# Patient Record
Sex: FEMALE | Race: WHITE
[De-identification: names, ages, dates, MRNs, and addresses within clinical notes are randomized per-mention and may not be internally consistent; named-entity substitution may affect disease eponyms.]

---

## 2017-01-26 NOTE — ED
Influenza-Like Illness





- HPI Summary


HPI Summary: 





The patient is a 21 female presenting to ED for complaint of chest pain with 

coughing and breathing. Admits to sudden onset of nasal congestion, rhinorrhea, 

dry cough, nausea, vomiting x1, and generalized myalgia. Was evaluated at 

[facility] and had positive Influenza A. Was prescribed Tamiflu and 

"Azithromax because they thought I might have PNA." Patient denies significant 

past medical or surgical history. Vaccinations up to date. 





SH: No smoking. Student at [employer].





- History of Current Complaint


Chief Complaint: EDUpperRespComplaint


Time Seen by Provider: 01/26/17 22:18





- Allergy/Home Medications


Allergies/Adverse Reactions: 


 Allergies











Allergy/AdvReac Type Severity Reaction Status Date / Time


 


No Known Allergies Allergy   Verified 01/26/17 20:56














PMH/Surg Hx/FS Hx/Imm Hx


Infectious Disease History: No


Infectious Disease History: 


   Denies: Traveled Outside the [place] in Last 30 Days





Review of Systems


Positive: Fever, Chills, Fatigue


Eyes: Negative


Positive: Nasal Discharge.  Negative: Sore Throat


Positive: Chest Pain


Positive: Cough.  Negative: Shortness Of Breath


Positive: Vomiting, Nausea.  Negative: Abdominal Pain, Diarrhea


Genitourinary: Negative


Negative: burning, dysuria, discharge, flank pain, hematuria, urgency


Skin: Negative


Negative: Rash


Positive: Headache


All Other Systems Reviewed And Are Negative: Yes





Physical Exam


Triage Information Reviewed: Yes


Vital Signs On Initial Exam: 


 Initial Vitals











Temp Pulse Resp BP Pulse Ox


 


 101.4 F   120   20   170/76   100 


 


 01/26/17 20:53  01/26/17 20:53  01/26/17 20:53  01/26/17 20:53  01/26/17 20:53











Vital Signs Reviewed: Yes


Appearance: Positive: No Pain Distress, Well-Nourished, Ill-Appearing - mildly 

ill appearing


Skin: Positive: Warm, Skin Color Reflects Adequate Perfusion, Dry


Head/Face: Positive: Normal Head/Face Inspection


Eyes: Positive: Normal, EOMI, RICARDO, Conjunctiva Clear


ENT: Positive: Hearing grossly normal, Pharynx normal, Nasal congestion, Nasal 

drainage, TMs normal


Neck: Positive: Supple, Nontender, No Lymphadenopathy.  Negative: Nuchal 

Rigidity


Respiratory/Lung Sounds: Positive: Clear to Auscultation, Breath Sounds 

Present.  Negative: Decreased Breath Sounds, Rales, Rhonchi, Wheezes, Unable to 

speak in full sentences, Fatigue


Cardiovascular: Positive: Pulses are Symmetrical in both Upper and Lower 

Extremities, Tachycardia, S1, S2.  Negative: Murmur, Rub


Abdomen Description: Positive: Nontender, No Organomegaly, Soft.  Negative: CVA 

Tenderness (R), CVA Tenderness (L), Distended, Guarding, Hepatomegaly, 

Peritoneal Signs, Splenomegaly


Bowel Sounds: Positive: Present


Musculoskeletal: Positive: Normal - AROM all extremities


Neurological: Positive: Normal - awake, alert


Psychiatric: Positive: Normal


AVPU Assessment: Alert





Diagnostics





- Vital Signs


 Vital Signs











  Temp Pulse Resp BP Pulse Ox


 


 01/26/17 20:53  101.4 F  120  20  170/76  100














- Laboratory


Lab Statement: Any lab studies that have been ordered have been reviewed, and 

results considered in the medical decision making process.





Flu Symptom Course/Dx





- Course


Assessment/Plan: CXR without acute cardiopulmonary disease. Impression of 

Influenza per positive results at [facility] earlier today. Advised to continue 

with Tamiflu. Will send Rx Zofran. Declined need for school note as off 

tomorrow and the weekend. To follow-up with student [facility] 1 week.





- Diagnoses


Provider Diagnoses: 


 Influenza A








Discharge





- Discharge Plan


Condition: Stable


Disposition: HOME


Prescriptions: 


Ondansetron ODT TAB* [Zofran Odt TAB*] 4 mg PO Q8H PRN #15 tab.odt


 PRN Reason: Nausea


Patient Education Materials:  Influenza (ED)


Referrals: 


[facility] Shields [Primary Care Provider] - 1 Week

## 2017-01-26 NOTE — RAD
INDICATION: Short of breath     



COMPARISON: None

 

TECHNIQUE: PA and lateral dual-energy views were obtained.



FINDINGS: 



Bones/Soft Tissues: There are no acute bony findings. There is a minor scoliotic

deformity.



Cardiomediastinal: The cardiomediastinal silhouette is normal. 



Lungs: There are no infiltrates.



Pleura: There are no pleural effusions. 



Other: None



IMPRESSION:  NO ACTIVE DISEASE.

## 2018-04-26 ENCOUNTER — HOSPITAL ENCOUNTER (OUTPATIENT)
Dept: HOSPITAL 25 - OREAST | Age: 23
Discharge: HOME | End: 2018-04-26
Attending: ORTHOPAEDIC SURGERY
Payer: COMMERCIAL

## 2018-04-26 VITALS — SYSTOLIC BLOOD PRESSURE: 105 MMHG | DIASTOLIC BLOOD PRESSURE: 67 MMHG

## 2018-04-26 DIAGNOSIS — D18.09: Primary | ICD-10-CM

## 2018-04-26 DIAGNOSIS — Z87.440: ICD-10-CM

## 2018-04-26 PROCEDURE — 88305 TISSUE EXAM BY PATHOLOGIST: CPT

## 2018-04-26 PROCEDURE — 81025 URINE PREGNANCY TEST: CPT

## 2021-12-28 NOTE — OP
OPERATIVE REPORT:

 

DATE OF OPERATION:  04/26/18 - [facility]

 

DATE OF BIRTH:  [birth year]

 

SURGEON:  Tate Vizcarra MD.

 

ASSISTANT:  MICHELLE Alford.

 

ANESTHESIOLOGIST:  Dr. Gonzalez.

 

ANESTHESIA:  Local MAC.

 

PRE-OP DIAGNOSIS:  Right index finger dorsal proximal interphalangeal joint 
mass consistent with an arteriovenous malformation.

 

POST-OP DIAGNOSIS:  Right index finger dorsal proximal interphalangeal joint 
mass consistent with an arteriovenous malformation.

 

OPERATIVE PROCEDURE:  Excision of right index finger dorsal mass.

 

INDICATIONS:  Delphine is 22 years old.  She has a right index finger dorsal 
mass that enlarges.  Once the finger is in a dependent position, it hurts more 
when she uses the hand; it is relieved by rest.  It has a purplish consistency 
to it.  I told it is likely a hemangioma or an arteriovenous malformation.  She 
understands there is a high recurrence rate she wants to have it excised.

 

ESTIMATED BLOOD LOSS:  2 mL.

 

COMPLICATIONS:  None.

 

FINDINGS:  As expected.

 

DESCRIPTION OF PROCEDURE:  Delphine was seen in the preoperative holding area.  
When we met in the operating room, we had a time out and I anesthetized the 
finger with a digital block with 0.25% plain Marcaine.  The arm was then 
prepped and draped in the usual fashion.  A time out was performed.

 

We gravity exsanguinated the finger and the forearm tourniquet was inflated to 
250 mmHg.  I made a curvilinear incision over the mass.  Dissection was carried 
down and a marginal excision of the mass was performed, it was a purplish mass.
  It was excised in its entirety.  I went ahead and took the surrounding dorsal 
veins that were adjacent to the mass.  I tied them off with a 4-0 silk tie and 
the mass was fully excised and handed off as a specimen. Once this was 
completely excised, I irrigated out the wound.  The skin was closed with 4-0 
nylon suture.  Wound was dressed with Xeroform, 1-inch Mary Jane, and a Coban 
dressing.  She was taken to the recovery room in stable condition.

 

 [id number]/CPS #: [id number]

[facility]
<-- Click to add NO pertinent Family History